# Patient Record
Sex: FEMALE | Race: WHITE | URBAN - NONMETROPOLITAN AREA
[De-identification: names, ages, dates, MRNs, and addresses within clinical notes are randomized per-mention and may not be internally consistent; named-entity substitution may affect disease eponyms.]

---

## 2017-05-05 ENCOUNTER — OFFICE VISIT (OUTPATIENT)
Dept: PSYCHIATRY | Age: 22
End: 2017-05-05
Payer: COMMERCIAL

## 2017-05-05 VITALS
SYSTOLIC BLOOD PRESSURE: 106 MMHG | HEART RATE: 55 BPM | BODY MASS INDEX: 36.48 KG/M2 | OXYGEN SATURATION: 100 % | HEIGHT: 63 IN | WEIGHT: 205.9 LBS | DIASTOLIC BLOOD PRESSURE: 65 MMHG

## 2017-05-05 DIAGNOSIS — F33.2 SEVERE EPISODE OF RECURRENT MAJOR DEPRESSIVE DISORDER, WITHOUT PSYCHOTIC FEATURES (HCC): Primary | ICD-10-CM

## 2017-05-05 DIAGNOSIS — F31.9 BIPOLAR DISORDER WITH DEPRESSION (HCC): ICD-10-CM

## 2017-05-05 PROCEDURE — 99999 PR OFFICE/OUTPT VISIT,PROCEDURE ONLY: CPT | Performed by: COUNSELOR

## 2017-05-05 PROCEDURE — 90791 PSYCH DIAGNOSTIC EVALUATION: CPT | Performed by: COUNSELOR

## 2017-05-05 RX ORDER — GABAPENTIN 300 MG/1
300 CAPSULE ORAL DAILY
COMMUNITY
Start: 2017-04-10 | End: 2017-11-20 | Stop reason: ALTCHOICE

## 2017-05-05 RX ORDER — LAMOTRIGINE 150 MG/1
150 TABLET ORAL DAILY
COMMUNITY
Start: 2017-04-13 | End: 2017-09-08 | Stop reason: SDUPTHER

## 2017-05-05 RX ORDER — ARIPIPRAZOLE 10 MG/1
10 TABLET ORAL DAILY
COMMUNITY
Start: 2017-04-10 | End: 2017-11-20 | Stop reason: ALTCHOICE

## 2017-05-05 RX ORDER — FLUOXETINE HYDROCHLORIDE 20 MG/1
20 CAPSULE ORAL DAILY
COMMUNITY
Start: 2017-04-07 | End: 2017-11-20 | Stop reason: SDUPTHER

## 2017-05-05 ASSESSMENT — PATIENT HEALTH QUESTIONNAIRE - PHQ9
9. THOUGHTS THAT YOU WOULD BE BETTER OFF DEAD, OR OF HURTING YOURSELF: 0
4. FEELING TIRED OR HAVING LITTLE ENERGY: 3
5. POOR APPETITE OR OVEREATING: 3
7. TROUBLE CONCENTRATING ON THINGS, SUCH AS READING THE NEWSPAPER OR WATCHING TELEVISION: 2
1. LITTLE INTEREST OR PLEASURE IN DOING THINGS: 3
8. MOVING OR SPEAKING SO SLOWLY THAT OTHER PEOPLE COULD HAVE NOTICED. OR THE OPPOSITE, BEING SO FIGETY OR RESTLESS THAT YOU HAVE BEEN MOVING AROUND A LOT MORE THAN USUAL: 3
SUM OF ALL RESPONSES TO PHQ QUESTIONS 1-9: 21
10. IF YOU CHECKED OFF ANY PROBLEMS, HOW DIFFICULT HAVE THESE PROBLEMS MADE IT FOR YOU TO DO YOUR WORK, TAKE CARE OF THINGS AT HOME, OR GET ALONG WITH OTHER PEOPLE: 3
SUM OF ALL RESPONSES TO PHQ9 QUESTIONS 1 & 2: 6
6. FEELING BAD ABOUT YOURSELF - OR THAT YOU ARE A FAILURE OR HAVE LET YOURSELF OR YOUR FAMILY DOWN: 1
2. FEELING DOWN, DEPRESSED OR HOPELESS: 3
3. TROUBLE FALLING OR STAYING ASLEEP: 3

## 2017-05-15 ENCOUNTER — OFFICE VISIT (OUTPATIENT)
Dept: PSYCHIATRY | Age: 22
End: 2017-05-15
Payer: COMMERCIAL

## 2017-05-15 VITALS
OXYGEN SATURATION: 99 % | WEIGHT: 202.9 LBS | HEIGHT: 63 IN | DIASTOLIC BLOOD PRESSURE: 74 MMHG | BODY MASS INDEX: 35.95 KG/M2 | HEART RATE: 78 BPM | SYSTOLIC BLOOD PRESSURE: 118 MMHG

## 2017-05-15 VITALS
DIASTOLIC BLOOD PRESSURE: 74 MMHG | SYSTOLIC BLOOD PRESSURE: 118 MMHG | HEIGHT: 63 IN | WEIGHT: 202 LBS | BODY MASS INDEX: 35.79 KG/M2 | OXYGEN SATURATION: 99 % | HEART RATE: 78 BPM

## 2017-05-15 DIAGNOSIS — F33.1 MODERATE EPISODE OF RECURRENT MAJOR DEPRESSIVE DISORDER (HCC): Primary | ICD-10-CM

## 2017-05-15 DIAGNOSIS — R53.83 FATIGUE, UNSPECIFIED TYPE: ICD-10-CM

## 2017-05-15 DIAGNOSIS — F33.1 MODERATE EPISODE OF RECURRENT MAJOR DEPRESSIVE DISORDER (HCC): ICD-10-CM

## 2017-05-15 LAB
BASOPHILS ABSOLUTE: 0.1 K/UL (ref 0–0.2)
BASOPHILS RELATIVE PERCENT: 0.6 % (ref 0–1)
CHOLESTEROL, TOTAL: 163 MG/DL (ref 160–199)
EOSINOPHILS ABSOLUTE: 0.4 K/UL (ref 0–0.6)
EOSINOPHILS RELATIVE PERCENT: 4.6 % (ref 0–5)
HCT VFR BLD CALC: 41.2 % (ref 37–47)
HDLC SERPL-MCNC: 50 MG/DL (ref 65–121)
HEMOGLOBIN: 13.2 G/DL (ref 12–16)
LDL CHOLESTEROL CALCULATED: 90 MG/DL
LYMPHOCYTES ABSOLUTE: 2.3 K/UL (ref 1.1–4.5)
LYMPHOCYTES RELATIVE PERCENT: 27.6 % (ref 20–40)
MCH RBC QN AUTO: 28.1 PG (ref 27–31)
MCHC RBC AUTO-ENTMCNC: 32 G/DL (ref 33–37)
MCV RBC AUTO: 87.7 FL (ref 81–99)
MONOCYTES ABSOLUTE: 0.5 K/UL (ref 0–0.9)
MONOCYTES RELATIVE PERCENT: 6.3 % (ref 0–10)
NEUTROPHILS ABSOLUTE: 5.2 K/UL (ref 1.5–7.5)
NEUTROPHILS RELATIVE PERCENT: 60.7 % (ref 50–65)
PDW BLD-RTO: 13.1 % (ref 11.5–14.5)
PLATELET # BLD: 307 K/UL (ref 130–400)
PMV BLD AUTO: 10 FL (ref 7.4–10.4)
RBC # BLD: 4.7 M/UL (ref 4.2–5.4)
T4 FREE: 1.4 NG/ML (ref 0.9–1.7)
TRIGL SERPL-MCNC: 113 MG/DL (ref 150–199)
WBC # BLD: 8.5 K/UL (ref 4.8–10.8)

## 2017-05-15 PROCEDURE — 90791 PSYCH DIAGNOSTIC EVALUATION: CPT | Performed by: PSYCHIATRY & NEUROLOGY

## 2017-05-15 PROCEDURE — 90834 PSYTX W PT 45 MINUTES: CPT | Performed by: COUNSELOR

## 2017-05-15 RX ORDER — BUSPIRONE HYDROCHLORIDE 10 MG/1
10 TABLET ORAL 3 TIMES DAILY
COMMUNITY
End: 2017-11-20 | Stop reason: ALTCHOICE

## 2017-05-15 RX ORDER — FLUOXETINE HYDROCHLORIDE 40 MG/1
80 CAPSULE ORAL DAILY
Qty: 60 CAPSULE | Refills: 2 | Status: SHIPPED | OUTPATIENT
Start: 2017-05-15 | End: 2017-08-28 | Stop reason: SDUPTHER

## 2017-05-15 RX ORDER — LAMOTRIGINE 100 MG/1
100 TABLET ORAL 2 TIMES DAILY
Qty: 30 TABLET | Refills: 0 | Status: SHIPPED | OUTPATIENT
Start: 2017-05-15 | End: 2017-06-12 | Stop reason: SDUPTHER

## 2017-05-15 RX ORDER — GABAPENTIN 100 MG/1
100 CAPSULE ORAL 3 TIMES DAILY
Qty: 90 CAPSULE | Refills: 1 | Status: SHIPPED | OUTPATIENT
Start: 2017-05-15 | End: 2017-08-10 | Stop reason: SDUPTHER

## 2017-05-16 LAB — VITAMIN D 25-HYDROXY: 16.7 NG/ML

## 2017-05-17 LAB — TSH SERPL DL<=0.05 MIU/L-ACNC: 1.94 UIU/ML (ref 0.27–4.2)

## 2017-05-17 RX ORDER — ERGOCALCIFEROL 1.25 MG/1
50000 CAPSULE ORAL WEEKLY
Qty: 12 CAPSULE | Refills: 0 | Status: SHIPPED | OUTPATIENT
Start: 2017-05-17 | End: 2017-08-03

## 2017-06-12 RX ORDER — LAMOTRIGINE 100 MG/1
100 TABLET ORAL 2 TIMES DAILY
Qty: 30 TABLET | Refills: 0 | Status: SHIPPED | OUTPATIENT
Start: 2017-06-12 | End: 2017-07-17 | Stop reason: SDUPTHER

## 2017-07-12 ENCOUNTER — OFFICE VISIT (OUTPATIENT)
Dept: PSYCHIATRY | Age: 22
End: 2017-07-12
Payer: COMMERCIAL

## 2017-07-12 VITALS
HEART RATE: 65 BPM | BODY MASS INDEX: 36.87 KG/M2 | OXYGEN SATURATION: 98 % | HEIGHT: 63 IN | WEIGHT: 208.1 LBS | SYSTOLIC BLOOD PRESSURE: 102 MMHG | DIASTOLIC BLOOD PRESSURE: 60 MMHG

## 2017-07-12 DIAGNOSIS — F31.9 BIPOLAR DISORDER WITH DEPRESSION (HCC): Primary | ICD-10-CM

## 2017-07-12 PROCEDURE — 90833 PSYTX W PT W E/M 30 MIN: CPT | Performed by: PSYCHIATRY & NEUROLOGY

## 2017-07-12 PROCEDURE — 99213 OFFICE O/P EST LOW 20 MIN: CPT | Performed by: PSYCHIATRY & NEUROLOGY

## 2017-07-18 RX ORDER — LAMOTRIGINE 100 MG/1
100 TABLET ORAL 2 TIMES DAILY
Qty: 30 TABLET | Refills: 0 | Status: SHIPPED | OUTPATIENT
Start: 2017-07-18 | End: 2017-09-08 | Stop reason: SDUPTHER

## 2017-08-01 ENCOUNTER — TELEPHONE (OUTPATIENT)
Dept: PSYCHIATRY | Age: 22
End: 2017-08-01

## 2017-08-04 ENCOUNTER — TELEPHONE (OUTPATIENT)
Dept: PSYCHIATRY | Age: 22
End: 2017-08-04

## 2017-08-10 RX ORDER — GABAPENTIN 100 MG/1
100 CAPSULE ORAL 3 TIMES DAILY
Qty: 90 CAPSULE | Refills: 1 | Status: SHIPPED | OUTPATIENT
Start: 2017-08-10 | End: 2017-10-31 | Stop reason: SDUPTHER

## 2017-08-29 RX ORDER — FLUOXETINE HYDROCHLORIDE 40 MG/1
80 CAPSULE ORAL DAILY
Qty: 60 CAPSULE | Refills: 2 | Status: SHIPPED | OUTPATIENT
Start: 2017-08-29 | End: 2017-11-20 | Stop reason: SDUPTHER

## 2017-09-08 RX ORDER — LAMOTRIGINE 100 MG/1
100 TABLET ORAL 2 TIMES DAILY
Qty: 60 TABLET | Refills: 0 | Status: SHIPPED | OUTPATIENT
Start: 2017-09-08 | End: 2017-10-04 | Stop reason: SDUPTHER

## 2017-09-08 RX ORDER — LAMOTRIGINE 150 MG/1
150 TABLET ORAL DAILY
Qty: 30 TABLET | Refills: 0 | Status: SHIPPED | OUTPATIENT
Start: 2017-09-08 | End: 2017-10-04 | Stop reason: SDUPTHER

## 2017-09-08 RX ORDER — LAMOTRIGINE 100 MG/1
100 TABLET ORAL 2 TIMES DAILY
Qty: 60 TABLET | Refills: 0 | Status: CANCELLED | OUTPATIENT
Start: 2017-09-08

## 2017-09-11 ENCOUNTER — TELEPHONE (OUTPATIENT)
Dept: PSYCHIATRY | Age: 22
End: 2017-09-11

## 2017-10-04 RX ORDER — LAMOTRIGINE 100 MG/1
100 TABLET ORAL 2 TIMES DAILY
Qty: 60 TABLET | Refills: 0 | Status: SHIPPED | OUTPATIENT
Start: 2017-10-04 | End: 2017-10-31 | Stop reason: SDUPTHER

## 2017-10-04 RX ORDER — LAMOTRIGINE 150 MG/1
150 TABLET ORAL DAILY
Qty: 30 TABLET | Refills: 0 | Status: SHIPPED | OUTPATIENT
Start: 2017-10-04 | End: 2017-10-31 | Stop reason: SDUPTHER

## 2017-10-04 NOTE — TELEPHONE ENCOUNTER
Pt called for the following  Pt was last seen on 7-12-17 with Dr. Yanez August  10-16-17 with Dr. Renata Strong scheduled    Requested Prescriptions     Pending Prescriptions Disp Refills    lamoTRIgine (LAMICTAL) 100 MG tablet 60 tablet 0     Sig: Take 1 tablet by mouth 2 times daily    lamoTRIgine (LAMICTAL) 150 MG tablet 30 tablet 0     Sig: Take 1 tablet by mouth daily     10/4/2017 11:49 AM   Progress Note        Jyothi Krishna 1995  Psychotherapy Time Spent: 18 min      Psychotherapy Topics: family and health    Chief Complaint   Patient presents with    Medication Refill     Mood lability, depression, anxiety    Subjective: The patient is a 20year old white female here for follow up for treatment of her mood disorder and anxiety, most likely bipolar disorder type 2 which she has been seen in this office for past 6 months. Today the patient is here with her  of one month. She reports that she is improved until around 3 pm when she gets home from work and gets upset and angry. We discussed the patients medications and pt fails to take her noon dose of gabapentin which she says does help her with her moods. Pt encouraged to take the noon dose with her lunch. She and her  are going to work out a way for her to remember to take it. Patient reports side effects as follows: none. Reports no suicidal ideation. Reports compliance with medications as fair . Review of Systems - History obtained from spouse, chart review and the patient  Psychological ROS: positive for - anxiety and depression  14 point review of systems is negative   Current Meds:    Prior to Admission medications    Medication Sig Start Date End Date Taking?  Authorizing Provider   lamoTRIgine (LAMICTAL) 100 MG tablet Take 1 tablet by mouth 2 times daily 9/8/17   Ihsan Ignacia, APRN   lamoTRIgine (LAMICTAL) 150 MG tablet Take 1 tablet by mouth daily 9/8/17   Ihsan Ignacia, APRN   FLUoxetine (PROZAC) 40 MG capsule Take 2 capsules by mouth daily 8/29/17   Lyly Phillip,    gabapentin (NEURONTIN) 100 MG capsule Take 1 capsule by mouth 3 times daily 8/10/17   BÁRBARA Silva   vitamin D (ERGOCALCIFEROL) 88848 UNITS CAPS capsule Take 1 capsule by mouth once a week for 12 doses 5/17/17 8/3/17  Sean Chappell DO   busPIRone (BUSPAR) 10 MG tablet Take 10 mg by mouth 3 times daily    Historical Provider, MD   ARIPiprazole (ABILIFY) 10 MG tablet Take 10 mg by mouth daily 4/10/17   Historical Provider, MD   FLUoxetine (PROZAC) 20 MG capsule Take 20 mg by mouth daily 4/7/17   Historical Provider, MD   gabapentin (NEURONTIN) 300 MG capsule Take 300 mg by mouth daily 4/10/17   Historical Provider, MD   triamcinolone (KENALOG) 0.1 % ointment  4/18/17   Historical Provider, MD       @    MSE:  Patient is  A & O x3. Appearance:  well-appearing  Cognition:  Recent memory intact , remote memory intact , good fund of knowledge, average intelligence level. Speech:  normal  Language: Naming: intact; Word Finding: intact  Conversation no evidence of delusions  Behavior:  Cooperative  Mood: anxious  Affect: congruent with mood  Thought Content: negative   Thought Process: goal directed  Judgement Insight:  normal and fair  Gait and Station:normal gait and station   Musculoskeletal:no issues    Assesment:   No diagnosis found. Plan:  1. The risks, benefits, side effects, indications, contraindications, and adverse effects of the medications have been discussed. 2. The pt has verbalized understanding and has capacity to give informed consent. 3. The Sabine Avalos report has been reviewed according to [facility] regulations. 4. Supportive therapy offered. 5. Individual therapy: options discussed  6. Follow up: No Follow-up on file. 7. The patient has been advised to call with any problems. 8. Controlled substance Treatment Plan: none. 9. The above listed medications have been continued, modifications in meds and other orders/labs as follows:     No orders of

## 2017-10-31 RX ORDER — LAMOTRIGINE 100 MG/1
100 TABLET ORAL 2 TIMES DAILY
Qty: 60 TABLET | Refills: 0 | Status: SHIPPED | OUTPATIENT
Start: 2017-10-31 | End: 2017-11-20 | Stop reason: SDUPTHER

## 2017-10-31 RX ORDER — GABAPENTIN 100 MG/1
100 CAPSULE ORAL 3 TIMES DAILY
Qty: 90 CAPSULE | Refills: 1 | Status: SHIPPED | OUTPATIENT
Start: 2017-10-31 | End: 2017-11-20 | Stop reason: SDUPTHER

## 2017-10-31 RX ORDER — LAMOTRIGINE 150 MG/1
150 TABLET ORAL DAILY
Qty: 30 TABLET | Refills: 0 | Status: SHIPPED | OUTPATIENT
Start: 2017-10-31 | End: 2017-11-20 | Stop reason: CLARIF

## 2017-10-31 NOTE — TELEPHONE ENCOUNTER
tablet Take 1 tablet by mouth daily 10/4/17   BÁRBARA Barber   FLUoxetine (PROZAC) 40 MG capsule Take 2 capsules by mouth daily 8/29/17   Erjennie Truong,    gabapentin (NEURONTIN) 100 MG capsule Take 1 capsule by mouth 3 times daily 8/10/17   BÁRBARA Barber   vitamin D (ERGOCALCIFEROL) 66134 UNITS CAPS capsule Take 1 capsule by mouth once a week for 12 doses 5/17/17 8/3/17  Jermaine Chappell DO   busPIRone (BUSPAR) 10 MG tablet Take 10 mg by mouth 3 times daily    Historical Provider, MD   ARIPiprazole (ABILIFY) 10 MG tablet Take 10 mg by mouth daily 4/10/17   Historical Provider, MD   FLUoxetine (PROZAC) 20 MG capsule Take 20 mg by mouth daily 4/7/17   Historical Provider, MD   gabapentin (NEURONTIN) 300 MG capsule Take 300 mg by mouth daily 4/10/17   Historical Provider, MD   triamcinolone (KENALOG) 0.1 % ointment  4/18/17   Historical Provider, MD       @    MSE:  Patient is  A & O x3. Appearance:  well-appearing  Cognition:  Recent memory intact , remote memory intact , good fund of knowledge, average intelligence level. Speech:  normal  Language: Naming: intact; Word Finding: intact  Conversation no evidence of delusions  Behavior:  Cooperative  Mood: anxious  Affect: congruent with mood  Thought Content: negative   Thought Process: goal directed  Judgement Insight:  normal and fair  Gait and Station:normal gait and station   Musculoskeletal:no issues    Assesment:   No diagnosis found. Plan:  1. The risks, benefits, side effects, indications, contraindications, and adverse effects of the medications have been discussed. 2. The pt has verbalized understanding and has capacity to give informed consent. 3. The Flash Marroquin report has been reviewed according to [facility] regulations. 4. Supportive therapy offered. 5. Individual therapy: options discussed  6. Follow up: No Follow-up on file. 7. The patient has been advised to call with any problems. 8. Controlled substance Treatment Plan: none.   9. The above listed medications have been continued, modifications in meds and other orders/labs as follows: No orders of the defined types were placed in this encounter. No orders of the defined types were placed in this encounter. 10. Additional comments:    ·  Cognitive reframing as therapeutic intervention              William Chappell Ed.D.D.O.DFAPA

## 2017-10-31 NOTE — TELEPHONE ENCOUNTER
Called pt Gabapentin into [facility] DR GEE Vera, NP/ Dr. Mariia Lang. Left message on pharmacist vm. Pt notified.

## 2017-11-20 ENCOUNTER — OFFICE VISIT (OUTPATIENT)
Dept: PSYCHIATRY | Age: 22
End: 2017-11-20
Payer: COMMERCIAL

## 2017-11-20 ENCOUNTER — TELEPHONE (OUTPATIENT)
Dept: PSYCHIATRY | Age: 22
End: 2017-11-20

## 2017-11-20 VITALS
DIASTOLIC BLOOD PRESSURE: 73 MMHG | OXYGEN SATURATION: 96 % | WEIGHT: 227 LBS | HEART RATE: 79 BPM | HEIGHT: 63 IN | BODY MASS INDEX: 40.22 KG/M2 | SYSTOLIC BLOOD PRESSURE: 134 MMHG

## 2017-11-20 DIAGNOSIS — F31.9 BIPOLAR DISORDER WITH DEPRESSION (HCC): Primary | ICD-10-CM

## 2017-11-20 PROCEDURE — 99214 OFFICE O/P EST MOD 30 MIN: CPT | Performed by: NURSE PRACTITIONER

## 2017-11-20 RX ORDER — LAMOTRIGINE 100 MG/1
100 TABLET ORAL 2 TIMES DAILY
Qty: 60 TABLET | Refills: 2 | Status: SHIPPED | OUTPATIENT
Start: 2017-11-20 | End: 2018-03-07 | Stop reason: SDUPTHER

## 2017-11-20 RX ORDER — FLUOXETINE HYDROCHLORIDE 40 MG/1
80 CAPSULE ORAL DAILY
Qty: 60 CAPSULE | Refills: 2 | Status: SHIPPED | OUTPATIENT
Start: 2017-11-20 | End: 2018-03-07 | Stop reason: SDUPTHER

## 2017-11-20 RX ORDER — GABAPENTIN 100 MG/1
100 CAPSULE ORAL 3 TIMES DAILY
Qty: 90 CAPSULE | Refills: 0 | Status: SHIPPED | OUTPATIENT
Start: 2017-11-20 | End: 2018-01-10 | Stop reason: SDUPTHER

## 2017-11-20 NOTE — PROGRESS NOTES
11/20/2017 11:41 AM   Progress Note        Ricardo Castleman 1995  Psychotherapy Time Spent: 25 min      Psychotherapy Topics: health and medication and symptom management    Chief Complaint   Patient presents with    3 Month Follow-Up         Subjective:  Patient is a  21 yo CF diagnosed with Bipolar Depression and presents today for follow-up with her . Last seen in clinic on 7/12/17 and prior records were reviewed. Today client states, \"Everything was good and then the last couple of weeks . .. I was on Vitamin D for two months. I started low and then I slowly raised and the last one month, my moods have gone down again. I went to my GYN for my annual and I asked them to do a Vitamin D and it was really low, so they put me on 50,000 Units weekly for 8 weeks. I think my anxiety and depression are good. I don't get anxious and I don't get depressed. \"   reports she had a panic attack after leaving the movie theater. States this was the first panic attack that has occurred in years. Reports it was 20 or 30 minutes before she calmed down. Sleep at night is \"okay. \"  Appetite \"fine. \"  Denies SI/HI/SIB and no psychosis. Patient reports side effects as follows: none. No evidence of EPS, no cogwheeling or abnormal motor movements. Absent  suicidal ideation. Reports compliance with medications as good . Review of Systems - 12 point review negative except as noted above  History obtained via chart review and patient  No PCP currently      Current Meds:    Prior to Admission medications    Medication Sig Start Date End Date Taking?  Authorizing Provider   FLUoxetine (PROZAC) 40 MG capsule Take 2 capsules by mouth daily 11/20/17  Yes Wu Arriaza NP   gabapentin (NEURONTIN) 100 MG capsule Take 1 capsule by mouth 3 times daily 11/20/17  Yes Wu Arriaza NP   lamoTRIgine (LAMICTAL) 100 MG tablet Take 1 tablet by mouth 2 times daily 11/20/17  Yes Wu Arriaza NP   vitamin D Sig: Take 2 capsules by mouth daily     Dispense:  60 capsule     Refill:  2    gabapentin (NEURONTIN) 100 MG capsule     Sig: Take 1 capsule by mouth 3 times daily     Dispense:  90 capsule     Refill:  0    lamoTRIgine (LAMICTAL) 100 MG tablet     Sig: Take 1 tablet by mouth 2 times daily     Dispense:  60 tablet     Refill:  2      No orders of the defined types were placed in this encounter.       9. Additional comments:        Renee Mckenzie, CLAUSP-BC

## 2018-01-11 RX ORDER — GABAPENTIN 100 MG/1
100 CAPSULE ORAL 3 TIMES DAILY
Qty: 90 CAPSULE | Refills: 0 | Status: SHIPPED | OUTPATIENT
Start: 2018-01-11 | End: 2018-02-16 | Stop reason: SDUPTHER

## 2018-02-16 RX ORDER — GABAPENTIN 100 MG/1
100 CAPSULE ORAL 3 TIMES DAILY
Qty: 90 CAPSULE | Refills: 0 | Status: SHIPPED | OUTPATIENT
Start: 2018-02-16 | End: 2018-03-07 | Stop reason: SDUPTHER

## 2018-02-16 NOTE — TELEPHONE ENCOUNTER
Called pt Gabapentin into [address] per K. Hamilton Klinefelter, NP. Left message on pharmacist vm. Pt notified.

## 2018-02-16 NOTE — TELEPHONE ENCOUNTER
Pt  called for a refill of the following Rx. Pt was last seen on 11/20/17 and has a follow up on 02/20/18. Requested Prescriptions     Pending Prescriptions Disp Refills    gabapentin (NEURONTIN) 100 MG capsule 90 capsule 0     Sig: Take 1 capsule by mouth 3 times daily for 30 days. 2/16/2018 10:16 AM   Progress Note        Brysondeborah Miki 1995  Psychotherapy Time Spent: 25 min      Psychotherapy Topics: health and medication and symptom management    Chief Complaint   Patient presents with    Medication Refill         Subjective:  Patient is a  19 yo CF diagnosed with Bipolar Depression and presents today for follow-up with her . Last seen in clinic on 7/12/17 and prior records were reviewed. Today client states, \"Everything was good and then the last couple of weeks . .. I was on Vitamin D for two months. I started low and then I slowly raised and the last one month, my moods have gone down again. I went to my GYN for my annual and I asked them to do a Vitamin D and it was really low, so they put me on 50,000 Units weekly for 8 weeks. I think my anxiety and depression are good. I don't get anxious and I don't get depressed. \"   reports she had a panic attack after leaving the movie theater. States this was the first panic attack that has occurred in years. Reports it was 20 or 30 minutes before she calmed down. Sleep at night is \"okay. \"  Appetite \"fine. \"  Denies SI/HI/SIB and no psychosis. Patient reports side effects as follows: none. No evidence of EPS, no cogwheeling or abnormal motor movements. Absent  suicidal ideation. Reports compliance with medications as good . Review of Systems - 12 point review negative except as noted above  History obtained via chart review and patient  No PCP currently      Current Meds:    Prior to Admission medications    Medication Sig Start Date End Date Taking?  Authorizing Provider   gabapentin (NEURONTIN) 100 MG capsule

## 2018-03-07 ENCOUNTER — TELEPHONE (OUTPATIENT)
Dept: PSYCHIATRY | Age: 23
End: 2018-03-07

## 2018-03-07 ENCOUNTER — OFFICE VISIT (OUTPATIENT)
Dept: PSYCHIATRY | Age: 23
End: 2018-03-07
Payer: COMMERCIAL

## 2018-03-07 VITALS
DIASTOLIC BLOOD PRESSURE: 74 MMHG | WEIGHT: 237 LBS | SYSTOLIC BLOOD PRESSURE: 124 MMHG | HEART RATE: 79 BPM | BODY MASS INDEX: 41.99 KG/M2 | HEIGHT: 63 IN | OXYGEN SATURATION: 100 %

## 2018-03-07 DIAGNOSIS — F31.9 BIPOLAR DISORDER WITH DEPRESSION (HCC): Primary | ICD-10-CM

## 2018-03-07 PROCEDURE — G8417 CALC BMI ABV UP PARAM F/U: HCPCS | Performed by: NURSE PRACTITIONER

## 2018-03-07 PROCEDURE — G8427 DOCREV CUR MEDS BY ELIG CLIN: HCPCS | Performed by: NURSE PRACTITIONER

## 2018-03-07 PROCEDURE — 99214 OFFICE O/P EST MOD 30 MIN: CPT | Performed by: NURSE PRACTITIONER

## 2018-03-07 PROCEDURE — G8484 FLU IMMUNIZE NO ADMIN: HCPCS | Performed by: NURSE PRACTITIONER

## 2018-03-07 PROCEDURE — 1036F TOBACCO NON-USER: CPT | Performed by: NURSE PRACTITIONER

## 2018-03-07 RX ORDER — FLUOXETINE HYDROCHLORIDE 40 MG/1
80 CAPSULE ORAL DAILY
Qty: 60 CAPSULE | Refills: 2 | Status: SHIPPED | OUTPATIENT
Start: 2018-03-07

## 2018-03-07 RX ORDER — SPIRONOLACTONE 50 MG/1
50 TABLET, FILM COATED ORAL EVERY MORNING
COMMUNITY
Start: 2018-02-09 | End: 2018-05-11

## 2018-03-07 RX ORDER — LAMOTRIGINE 100 MG/1
100 TABLET ORAL 2 TIMES DAILY
Qty: 60 TABLET | Refills: 2 | Status: SHIPPED | OUTPATIENT
Start: 2018-03-07 | End: 2018-07-02 | Stop reason: SDUPTHER

## 2018-03-07 RX ORDER — GABAPENTIN 100 MG/1
100 CAPSULE ORAL 3 TIMES DAILY
Qty: 90 CAPSULE | Refills: 2 | Status: SHIPPED | OUTPATIENT
Start: 2018-03-07 | End: 2018-04-06

## 2018-03-07 NOTE — PROGRESS NOTES
3/7/2018 11:53 AM   Progress Note        Mat Miki 1995  Psychotherapy Time Spent: 16 min      Psychotherapy Topics: family, health and medication/symptom management    Chief Complaint   Patient presents with    Medication Check         Subjective:  Patient is a  21 yo CF diagnosed with Bipolar Depression and presents today for follow-up with her . Last seen in clinic on 7/12/17 and prior records were reviewed. Today client states, \" Everything is okay, I think. I started that magnesium and it has really helped to chill me out and calm me down. \"   states, \"I think everything is where it needs to be. \"  Sleep is stable. Appetite is \"a little too appetite-ey. \"  Denies SI/HI/SIB and no psychosis. Recently went to PCP for \"neck problems\" and has had follow-up with GYN. Also had ear infection two weeks ago that has now resolved. She states, \"My work has started this point system and I get a point taken off even if I have to go to a doctor's appt. The only way I won't get the point taken off is if you say I have to come back for a follow-up. \"      Patient reports side effects as follows: none. No evidence of EPS, no cogwheeling or abnormal motor movements. Absent  suicidal ideation. Reports compliance with medications as good . Review of Systems - 12 point review negative except as noted above  History obtained via chart review and patient  No PCP currently  BP is 124/74; P 79; 237 lbs; 5'3\" tall    Current Meds:    Prior to Admission medications    Medication Sig Start Date End Date Taking? Authorizing Provider   spironolactone (ALDACTONE) 50 MG tablet Take 50 mg by mouth every morning 2/9/18 5/11/18 Yes Historical Provider, MD   FLUoxetine (PROZAC) 40 MG capsule Take 2 capsules by mouth daily 3/7/18  Yes Martha Newman NP   gabapentin (NEURONTIN) 100 MG capsule Take 1 capsule by mouth 3 times daily for 30 days.  3/7/18 4/6/18 Yes Martha Newman NP   lamoTRIgine

## 2018-07-03 RX ORDER — LAMOTRIGINE 100 MG/1
100 TABLET ORAL 2 TIMES DAILY
Qty: 60 TABLET | Refills: 2 | Status: SHIPPED | OUTPATIENT
Start: 2018-07-03

## 2022-11-25 ENCOUNTER — APPOINTMENT (OUTPATIENT)
Dept: ULTRASOUND IMAGING | Age: 27
End: 2022-11-25
Payer: COMMERCIAL

## 2022-11-25 ENCOUNTER — HOSPITAL ENCOUNTER (OUTPATIENT)
Age: 27
Discharge: HOME | End: 2022-11-26
Attending: NURSE PRACTITIONER | Admitting: NURSE PRACTITIONER
Payer: COMMERCIAL

## 2022-11-25 VITALS
WEIGHT: 220 LBS | SYSTOLIC BLOOD PRESSURE: 126 MMHG | BODY MASS INDEX: 38.98 KG/M2 | HEART RATE: 74 BPM | HEIGHT: 63 IN | TEMPERATURE: 97.6 F | RESPIRATION RATE: 16 BRPM | DIASTOLIC BLOOD PRESSURE: 73 MMHG

## 2022-11-25 PROBLEM — Z3A.25 25 WEEKS GESTATION OF PREGNANCY: Status: ACTIVE | Noted: 2022-11-25

## 2022-11-25 LAB
BILIRUBIN URINE: NEGATIVE
BLOOD, URINE: NEGATIVE
CLARITY: CLEAR
COLOR: YELLOW
EPITHELIAL CELLS, UA: NORMAL /HPF
GLUCOSE URINE: NEGATIVE MG/DL
KETONES, URINE: NEGATIVE MG/DL
LEUKOCYTE ESTERASE, URINE: ABNORMAL
NITRITE, URINE: NEGATIVE
PH UA: 6 (ref 5–8)
PROTEIN UA: NEGATIVE MG/DL
SPECIFIC GRAVITY UA: 1 (ref 1–1.03)
UROBILINOGEN, URINE: 0.2 E.U./DL
WBC UA: NORMAL /HPF (ref 0–5)

## 2022-11-25 PROCEDURE — 81001 URINALYSIS AUTO W/SCOPE: CPT

## 2022-11-25 RX ORDER — SODIUM CHLORIDE 0.9 % (FLUSH) 0.9 %
5-40 SYRINGE (ML) INJECTION EVERY 12 HOURS SCHEDULED
Status: DISCONTINUED | OUTPATIENT
Start: 2022-11-25 | End: 2022-11-26 | Stop reason: HOSPADM

## 2022-11-25 RX ORDER — BUSPIRONE HYDROCHLORIDE 15 MG/1
15 TABLET ORAL 3 TIMES DAILY
COMMUNITY

## 2022-11-25 RX ORDER — ONDANSETRON 2 MG/ML
4 INJECTION INTRAMUSCULAR; INTRAVENOUS EVERY 6 HOURS PRN
Status: DISCONTINUED | OUTPATIENT
Start: 2022-11-25 | End: 2022-11-26 | Stop reason: HOSPADM

## 2022-11-25 RX ORDER — ACETAMINOPHEN 325 MG/1
650 TABLET ORAL EVERY 4 HOURS PRN
Status: DISCONTINUED | OUTPATIENT
Start: 2022-11-25 | End: 2022-11-26 | Stop reason: HOSPADM

## 2022-11-25 RX ORDER — SODIUM CHLORIDE 9 MG/ML
INJECTION, SOLUTION INTRAVENOUS PRN
Status: DISCONTINUED | OUTPATIENT
Start: 2022-11-25 | End: 2022-11-26 | Stop reason: HOSPADM

## 2022-11-25 RX ORDER — SODIUM CHLORIDE 0.9 % (FLUSH) 0.9 %
5-40 SYRINGE (ML) INJECTION PRN
Status: DISCONTINUED | OUTPATIENT
Start: 2022-11-25 | End: 2022-11-26 | Stop reason: HOSPADM

## 2022-11-25 RX ORDER — ONDANSETRON 4 MG/1
4 TABLET, ORALLY DISINTEGRATING ORAL EVERY 8 HOURS PRN
Status: DISCONTINUED | OUTPATIENT
Start: 2022-11-25 | End: 2022-11-26 | Stop reason: HOSPADM

## 2022-11-26 ENCOUNTER — APPOINTMENT (OUTPATIENT)
Dept: ULTRASOUND IMAGING | Age: 27
End: 2022-11-26
Payer: COMMERCIAL

## 2022-11-26 PROCEDURE — 76770 US EXAM ABDO BACK WALL COMP: CPT

## 2022-11-26 PROCEDURE — 6370000000 HC RX 637 (ALT 250 FOR IP): Performed by: NURSE PRACTITIONER

## 2022-11-26 PROCEDURE — 99212 OFFICE O/P EST SF 10 MIN: CPT

## 2022-11-26 RX ORDER — CYCLOBENZAPRINE HCL 5 MG
10 TABLET ORAL 3 TIMES DAILY PRN
Status: DISCONTINUED | OUTPATIENT
Start: 2022-11-26 | End: 2022-11-26 | Stop reason: HOSPADM

## 2022-11-26 RX ADMIN — CYCLOBENZAPRINE HYDROCHLORIDE 10 MG: 5 TABLET, FILM COATED ORAL at 01:57

## 2022-11-26 ASSESSMENT — PAIN DESCRIPTION - LOCATION: LOCATION: BACK

## 2022-11-26 ASSESSMENT — PAIN SCALES - GENERAL: PAINLEVEL_OUTOF10: 4

## 2022-11-26 ASSESSMENT — PAIN DESCRIPTION - DESCRIPTORS: DESCRIPTORS: ACHING

## 2022-11-26 NOTE — FLOWSHEET NOTE
Teresita Price notified of reactive strip for 20 minutes. Okay to DC continuous fetal monitoring, but leave toco in case of contractions.

## 2022-11-26 NOTE — FLOWSHEET NOTE
Amelie Perez CNM notified of report from renal ultrasound. Orders for flexeril and okay to dc home if pain is improved.

## 2022-11-26 NOTE — DISCHARGE INSTRUCTIONS
LABOR AND DELIVERY - OBSERVATION DISCHARGE INSTRUCTIONS    TERM LABOR: RETURN TO HOSPITAL IF:  __There is a leaking or sudden gush of fluid from the vagina (note color, odor, time and amount of fluid)    __ You have bright red vaginal bleeding    __You begin to have regular contractions, occuring every 4-5 minutes, each contraction lasting 45-60 seconds for one hour. Time contractions from beginning of one to the beginning of the next. True contractions have a regular rate and become progressively closer. They are not changed by position change and are usually more uncomfortable when walking. P  OTHER INSTRUCTIONS  __ Keep follow up appointment with your OB.         NOTE: If you do not begin to feel better, or you have any questions, contact your physician or call [phone], or return to the hospital.

## 2022-11-26 NOTE — FLOWSHEET NOTE
Written and verbal discharge teaching provided to patient. All questions answered and concerns addressed. Pt encouraged to keep follow up appointment with her OB.